# Patient Record
Sex: FEMALE | Race: BLACK OR AFRICAN AMERICAN | Employment: FULL TIME | URBAN - METROPOLITAN AREA
[De-identification: names, ages, dates, MRNs, and addresses within clinical notes are randomized per-mention and may not be internally consistent; named-entity substitution may affect disease eponyms.]

---

## 2018-05-14 ENCOUNTER — HOSPITAL ENCOUNTER (EMERGENCY)
Facility: HOSPITAL | Age: 63
Discharge: HOME | End: 2018-05-14
Attending: EMERGENCY MEDICINE
Payer: COMMERCIAL

## 2018-05-14 VITALS
TEMPERATURE: 98 F | RESPIRATION RATE: 21 BRPM | SYSTOLIC BLOOD PRESSURE: 152 MMHG | WEIGHT: 220 LBS | BODY MASS INDEX: 35.36 KG/M2 | DIASTOLIC BLOOD PRESSURE: 91 MMHG | HEIGHT: 66 IN | HEART RATE: 68 BPM | OXYGEN SATURATION: 100 %

## 2018-05-14 DIAGNOSIS — L23.4 ALLERGIC CONTACT DERMATITIS DUE TO DYES: Primary | ICD-10-CM

## 2018-05-14 PROCEDURE — 96374 THER/PROPH/DIAG INJ IV PUSH: CPT

## 2018-05-14 PROCEDURE — 99284 EMERGENCY DEPT VISIT MOD MDM: CPT

## 2018-05-14 RX ORDER — METHYLPREDNISOLONE 4 MG/1
TABLET ORAL
Qty: 1 PACKAGE | Refills: 0 | Status: SHIPPED | OUTPATIENT
Start: 2018-05-14 | End: 2018-05-19

## 2018-05-14 RX ORDER — NAPROXEN SODIUM 220 MG
TABLET ORAL DAILY
COMMUNITY

## 2018-05-14 RX ORDER — HYDROCHLOROTHIAZIDE 25 MG/1
25 TABLET ORAL DAILY
COMMUNITY

## 2018-05-14 RX ORDER — DEXAMETHASONE SODIUM PHOSPHATE 4 MG/ML
10 VIAL (ML) INJECTION ONCE
Status: COMPLETED | OUTPATIENT
Start: 2018-05-14 | End: 2018-05-14

## 2018-05-14 NOTE — ED PROVIDER NOTES
Patient Seen in: [facility] Emergency Department    History   Patient presents with:   Allergic Rxn Allergies (immune)    Stated Complaint: allergic reaction    HPI    58-year-old with a history of asthma and allergies presents for evaluation of a po Erythematous rash to the scalp in the distribution of the hair without warmth or purulence  ED Course   Labs Reviewed - No data to display    ED Course as of May 14 0618  ------------------------------------------------------------    Patient had an IV yovani

## 2018-05-14 NOTE — ED INITIAL ASSESSMENT (HPI)
Pt ambulatory to er cc rash from hair color done on Saturday morning. Denies genoveva. States increase in redness and now feels burning around her eyes. Denies visual changes/drainage. Last benadryl 25mg q six hours - last taken at 0430 today.

## 2019-09-16 ENCOUNTER — OFFICE VISIT (OUTPATIENT)
Dept: ELECTROPHYSIOLOGY | Facility: HOSPITAL | Age: 64
End: 2019-09-16
Attending: FAMILY MEDICINE
Payer: COMMERCIAL

## 2019-09-16 DIAGNOSIS — M54.16 LUMBAR RADICULOPATHY: ICD-10-CM

## 2019-09-16 PROCEDURE — 95886 MUSC TEST DONE W/N TEST COMP: CPT | Performed by: OTHER

## 2019-09-16 PROCEDURE — 95909 NRV CNDJ TST 5-6 STUDIES: CPT | Performed by: OTHER

## 2019-09-16 PROCEDURE — 95885 MUSC TST DONE W/NERV TST LIM: CPT | Performed by: OTHER

## 2019-09-16 NOTE — PROCEDURES
[address] [place] [address]  [place], Analisa [facility]  [phone]            Patient: Ankur Mckeon Sex: Female  Patient ID: [id number] YOB: 1955      Visit Date: 9/16/2019 07:55  Patient Age On Visit Date: Muscle Nerve Roots IA Fib PSW Fasc H.F. Amp Dur. PPP Pattern   R. Tibialis anterior Deep peroneal (Fibular) L4-L5 N None None None None N N N N   R. Gastrocnemius (Medial head) Tibial S1-S2 1+ None 1+ None None N N N Mildly Reduced   R.  Vastus medialis Fem

## 2025-02-13 ENCOUNTER — OFFICE VISIT (OUTPATIENT)
Facility: LOCATION | Age: 70
End: 2025-02-13
Payer: MEDICARE

## 2025-02-13 DIAGNOSIS — E04.2 MULTIPLE THYROID NODULES: Primary | ICD-10-CM

## 2025-02-13 PROCEDURE — 99203 OFFICE O/P NEW LOW 30 MIN: CPT | Performed by: OTOLARYNGOLOGY

## 2025-02-13 NOTE — PROGRESS NOTES
Gina Johansen is a 69 year old female.   Chief Complaint   Patient presents with    Thyroid Problem     HPI:   She underwent a CT scan of the neck which showed thyroid nodules.  She has no pain or dysphagia.  She is not on thyroid medication.  She has no family history of thyroid cancer.  She has no history radiation exposure.  Current Outpatient Medications   Medication Sig Dispense Refill    hydrochlorothiazide 25 MG Oral Tab Take 25 mg by mouth daily.      Naproxen Sodium (ALEVE) 220 MG Oral Tab Take by mouth daily.        Past Medical History:    Asthma (HCC)      Social History:  Social History     Socioeconomic History    Marital status:    Tobacco Use    Smoking status: Never    Smokeless tobacco: Never      History reviewed. No pertinent surgical history.      REVIEW OF SYSTEMS:   GENERAL HEALTH: feels well otherwise  GENERAL : denies fever, chills, sweats, weight loss, weight gain  SKIN: denies any unusual skin lesions or rashes  RESPIRATORY: denies shortness of breath with exertion  NEURO: denies headaches    EXAM:   There were no vitals taken for this visit.    System Findings Details   Constitutional  Overall appearance - Normal.   Psychiatric  Orientation - Oriented to time, place, person & situation. Appropriate mood and affect.   Head/Face  Facial features -- Normal. Skull - Normal.   Eyes  Pupils equal ,round ,react to light and accomidate   Ears, Nose, Throat, Neck  Oropharynx clear neck supple without masses   Neurological  Memory - Normal. Cranial nerves - Cranial nerves II through XII grossly intact.   Lymph Detail  Submental. Submandibular. Anterior cervical. Posterior cervical. Supraclavicular.       ASSESSMENT AND PLAN:   1. Multiple thyroid nodules  Ultrasound from [facility] was reviewed.  This shows multiple nodules.  There is a 1.7 cm TR 4 nodule.  She will undergo biopsy of this nodule and we will speak after the above.  - US FNA THYROID, GUIDE INCLD, FIRST LESION  (CPT=10005); Future      The patient indicates understanding of these issues and agrees to the plan.    No follow-ups on file.    Harshal Rosales MD  2/13/2025  11:08 AM

## 2025-02-27 ENCOUNTER — HOSPITAL ENCOUNTER (OUTPATIENT)
Dept: ULTRASOUND IMAGING | Facility: HOSPITAL | Age: 70
Discharge: HOME | End: 2025-02-27
Attending: OTOLARYNGOLOGY
Payer: MEDICARE

## 2025-02-27 DIAGNOSIS — E04.2 MULTIPLE THYROID NODULES: ICD-10-CM

## 2025-02-27 PROCEDURE — 10005 FNA BX W/US GDN 1ST LES: CPT | Performed by: OTOLARYNGOLOGY

## 2025-02-27 PROCEDURE — 88173 CYTOPATH EVAL FNA REPORT: CPT | Performed by: OTOLARYNGOLOGY

## 2025-04-01 ENCOUNTER — TELEPHONE (OUTPATIENT)
Facility: LOCATION | Age: 70
End: 2025-04-01

## 2025-04-01 DIAGNOSIS — E04.2 NONTOXIC MULTINODULAR GOITER: Primary | ICD-10-CM

## 2025-05-15 ENCOUNTER — APPOINTMENT (OUTPATIENT)
Dept: ADMINISTRATIVE | Facility: HOSPITAL | Age: 70
End: 2025-05-15
Payer: MEDICARE

## 2025-05-15 RX ORDER — DICLOFENAC SODIUM 75 MG/1
75 TABLET, DELAYED RELEASE ORAL 2 TIMES DAILY PRN
COMMUNITY
End: 2025-05-28

## 2025-05-22 NOTE — H&P
[facility]  History & Physical    Gina Johansen Patient Status:  Outpatient in a Bed    1955 MRN [id number]   Location [facility] SURGERY Attending Harshal Rosales MD   Hosp Day # 0 PCP True Gary MD     History of Present Illness:  Gina Johansen is a(n) 69 year old female.  Patient with history of left-sided thyroid nodule.  This nodule underwent genomic testing which showed a 50% chance of malignancy.    History:  Past Medical History[1]  Past Surgical History[2]  Family History[3]   reports that she has never smoked. She has never used smokeless tobacco. She reports that she does not currently use alcohol. She reports that she does not use drugs.    Allergies:  Allergies[4]    Home Medications:  Prescriptions Prior to Admission[5]    Physical Exam:   General: Alert, orientated x3.  Cooperative.  No apparent distress.  Vital Signs:  Ht 5' 7\" (1.702 m)   Wt 190 lb (86.2 kg)   BMI 29.76 kg/m²   HEENT enlarged thyroid gland  Neck: No tenderness to palpitation.  Full range of motion to flexion and extension, lateral rotation and lateral flexion of cervical spine.  No JVD. Supple.   Lungs: Clear to auscultation bilaterally.  Cardiac: Regular rate and rhythm. No murmur.  Abdomen:  Soft, non-distended, non-tender, with no rebound or guarding.  No peritoneal signs. No ascites.  Liver is within normal limits.  Spleen is not palpable.    Extremities:  No lower extremity edema noted.  Without clubbing or cyanosis.    Skin: Normal texture and turgor.  Lymphatic:  No palpable cervical lymphadenopathy.  Neurologic: Cranial nerves are grossly intact.  Motor strength and sensory examination is grossly normal.  No focal neurologic deficit.    Laboratory Data:      Impression and Plan:  Problem List[6]  Left thyroid nodule 1.7 cm.  Fine-needle aspiration biopsy consistent with atypia of undetermined significance.  Genomic testing suggest a 50% chance of malignancy.    Left thyroid lobectomy with  possible total thyroidectomy if cancer is found.        Harshal Rosales MD  5/22/2025  10:05 AM         [1]   Past Medical History:   Anxiety state    Asthma (HCC)   [2] History reviewed. No pertinent surgical history.  [3] History reviewed. No pertinent family history.  [4]   Allergies  Allergen Reactions    Amoxicillin OTHER (SEE COMMENTS)     abd pain   [5]   No medications prior to admission.   [6]   Patient Active Problem List  Diagnosis    Lumbar radiculopathy

## 2025-05-27 ENCOUNTER — HOSPITAL ENCOUNTER (OUTPATIENT)
Facility: HOSPITAL | Age: 70
Discharge: HOME | End: 2025-05-28
Attending: OTOLARYNGOLOGY | Admitting: OTOLARYNGOLOGY
Payer: MEDICARE

## 2025-05-27 ENCOUNTER — ANESTHESIA EVENT (OUTPATIENT)
Dept: SURGERY | Facility: HOSPITAL | Age: 70
End: 2025-05-27
Payer: MEDICARE

## 2025-05-27 ENCOUNTER — ANESTHESIA (OUTPATIENT)
Dept: SURGERY | Facility: HOSPITAL | Age: 70
End: 2025-05-27
Payer: MEDICARE

## 2025-05-27 DIAGNOSIS — E04.2 NONTOXIC MULTINODULAR GOITER: ICD-10-CM

## 2025-05-27 PROBLEM — E04.1 THYROID NODULE: Status: ACTIVE | Noted: 2025-05-27

## 2025-05-27 PROCEDURE — 60220 PARTIAL REMOVAL OF THYROID: CPT | Performed by: OTOLARYNGOLOGY

## 2025-05-27 RX ORDER — CALCIUM CARBONATE 500(1250)
TABLET ORAL
Status: COMPLETED
Start: 2025-05-27 | End: 2025-05-27

## 2025-05-27 RX ORDER — SODIUM CHLORIDE AND POTASSIUM CHLORIDE 150; 900 MG/100ML; MG/100ML
INJECTION, SOLUTION INTRAVENOUS CONTINUOUS
Status: DISCONTINUED | OUTPATIENT
Start: 2025-05-27 | End: 2025-05-28

## 2025-05-27 RX ORDER — METOCLOPRAMIDE HYDROCHLORIDE 5 MG/ML
10 INJECTION INTRAMUSCULAR; INTRAVENOUS EVERY 8 HOURS PRN
Status: DISCONTINUED | OUTPATIENT
Start: 2025-05-27 | End: 2025-05-28

## 2025-05-27 RX ORDER — MIDAZOLAM HYDROCHLORIDE 1 MG/ML
INJECTION INTRAMUSCULAR; INTRAVENOUS AS NEEDED
Status: DISCONTINUED | OUTPATIENT
Start: 2025-05-27 | End: 2025-05-27 | Stop reason: SURG

## 2025-05-27 RX ORDER — LIDOCAINE HYDROCHLORIDE AND EPINEPHRINE 10; 10 MG/ML; UG/ML
INJECTION, SOLUTION INFILTRATION; PERINEURAL AS NEEDED
Status: DISCONTINUED | OUTPATIENT
Start: 2025-05-27 | End: 2025-05-27 | Stop reason: HOSPADM

## 2025-05-27 RX ORDER — CALCITRIOL 0.25 UG/1
CAPSULE, LIQUID FILLED ORAL
Status: COMPLETED
Start: 2025-05-27 | End: 2025-05-27

## 2025-05-27 RX ORDER — HYDROMORPHONE HYDROCHLORIDE 1 MG/ML
0.6 INJECTION, SOLUTION INTRAMUSCULAR; INTRAVENOUS; SUBCUTANEOUS EVERY 5 MIN PRN
Status: DISCONTINUED | OUTPATIENT
Start: 2025-05-27 | End: 2025-05-27 | Stop reason: HOSPADM

## 2025-05-27 RX ORDER — CALCIUM CARBONATE 500(1250)
1000 TABLET ORAL
Status: DISCONTINUED | OUTPATIENT
Start: 2025-05-27 | End: 2025-05-28

## 2025-05-27 RX ORDER — SODIUM CHLORIDE, SODIUM LACTATE, POTASSIUM CHLORIDE, CALCIUM CHLORIDE 600; 310; 30; 20 MG/100ML; MG/100ML; MG/100ML; MG/100ML
INJECTION, SOLUTION INTRAVENOUS CONTINUOUS
Status: DISCONTINUED | OUTPATIENT
Start: 2025-05-27 | End: 2025-05-27

## 2025-05-27 RX ORDER — HYDROMORPHONE HYDROCHLORIDE 1 MG/ML
0.4 INJECTION, SOLUTION INTRAMUSCULAR; INTRAVENOUS; SUBCUTANEOUS EVERY 2 HOUR PRN
Status: DISCONTINUED | OUTPATIENT
Start: 2025-05-27 | End: 2025-05-28

## 2025-05-27 RX ORDER — PHENYLEPHRINE HCL 10 MG/ML
VIAL (ML) INJECTION AS NEEDED
Status: DISCONTINUED | OUTPATIENT
Start: 2025-05-27 | End: 2025-05-27 | Stop reason: SURG

## 2025-05-27 RX ORDER — HYDROMORPHONE HYDROCHLORIDE 1 MG/ML
INJECTION, SOLUTION INTRAMUSCULAR; INTRAVENOUS; SUBCUTANEOUS
Status: COMPLETED
Start: 2025-05-27 | End: 2025-05-27

## 2025-05-27 RX ORDER — LIDOCAINE HYDROCHLORIDE 10 MG/ML
INJECTION, SOLUTION EPIDURAL; INFILTRATION; INTRACAUDAL; PERINEURAL AS NEEDED
Status: DISCONTINUED | OUTPATIENT
Start: 2025-05-27 | End: 2025-05-27 | Stop reason: SURG

## 2025-05-27 RX ORDER — ONDANSETRON 2 MG/ML
INJECTION INTRAMUSCULAR; INTRAVENOUS AS NEEDED
Status: DISCONTINUED | OUTPATIENT
Start: 2025-05-27 | End: 2025-05-27 | Stop reason: SURG

## 2025-05-27 RX ORDER — CALCITRIOL 0.25 UG/1
0.25 CAPSULE, LIQUID FILLED ORAL DAILY
Status: DISCONTINUED | OUTPATIENT
Start: 2025-05-27 | End: 2025-05-28

## 2025-05-27 RX ORDER — NALOXONE HYDROCHLORIDE 0.4 MG/ML
0.08 INJECTION, SOLUTION INTRAMUSCULAR; INTRAVENOUS; SUBCUTANEOUS AS NEEDED
Status: DISCONTINUED | OUTPATIENT
Start: 2025-05-27 | End: 2025-05-27 | Stop reason: HOSPADM

## 2025-05-27 RX ORDER — LABETALOL HYDROCHLORIDE 5 MG/ML
5 INJECTION, SOLUTION INTRAVENOUS EVERY 5 MIN PRN
Status: DISCONTINUED | OUTPATIENT
Start: 2025-05-27 | End: 2025-05-27 | Stop reason: HOSPADM

## 2025-05-27 RX ORDER — ONDANSETRON 2 MG/ML
4 INJECTION INTRAMUSCULAR; INTRAVENOUS EVERY 6 HOURS PRN
Status: DISCONTINUED | OUTPATIENT
Start: 2025-05-27 | End: 2025-05-28

## 2025-05-27 RX ORDER — HYDROCODONE BITARTRATE AND ACETAMINOPHEN 5; 325 MG/1; MG/1
1 TABLET ORAL ONCE AS NEEDED
Status: DISCONTINUED | OUTPATIENT
Start: 2025-05-27 | End: 2025-05-27 | Stop reason: HOSPADM

## 2025-05-27 RX ORDER — DEXAMETHASONE SODIUM PHOSPHATE 4 MG/ML
VIAL (ML) INJECTION AS NEEDED
Status: DISCONTINUED | OUTPATIENT
Start: 2025-05-27 | End: 2025-05-27 | Stop reason: SURG

## 2025-05-27 RX ORDER — ACETAMINOPHEN 500 MG
1000 TABLET ORAL ONCE
Status: DISCONTINUED | OUTPATIENT
Start: 2025-05-27 | End: 2025-05-27 | Stop reason: HOSPADM

## 2025-05-27 RX ORDER — HYDROMORPHONE HYDROCHLORIDE 1 MG/ML
0.4 INJECTION, SOLUTION INTRAMUSCULAR; INTRAVENOUS; SUBCUTANEOUS EVERY 5 MIN PRN
Status: DISCONTINUED | OUTPATIENT
Start: 2025-05-27 | End: 2025-05-27 | Stop reason: HOSPADM

## 2025-05-27 RX ORDER — OXYCODONE HYDROCHLORIDE 5 MG/1
5 TABLET ORAL EVERY 4 HOURS PRN
Status: DISCONTINUED | OUTPATIENT
Start: 2025-05-27 | End: 2025-05-28

## 2025-05-27 RX ORDER — CALCITRIOL 0.25 UG/1
0.25 CAPSULE, LIQUID FILLED ORAL ONCE
Status: COMPLETED | OUTPATIENT
Start: 2025-05-27 | End: 2025-05-27

## 2025-05-27 RX ORDER — SODIUM CHLORIDE, SODIUM LACTATE, POTASSIUM CHLORIDE, CALCIUM CHLORIDE 600; 310; 30; 20 MG/100ML; MG/100ML; MG/100ML; MG/100ML
INJECTION, SOLUTION INTRAVENOUS CONTINUOUS
Status: DISCONTINUED | OUTPATIENT
Start: 2025-05-27 | End: 2025-05-27 | Stop reason: HOSPADM

## 2025-05-27 RX ORDER — CALCIUM CARBONATE 500(1250)
1000 TABLET ORAL ONCE
Status: COMPLETED | OUTPATIENT
Start: 2025-05-27 | End: 2025-05-27

## 2025-05-27 RX ORDER — ACETAMINOPHEN 500 MG
1000 TABLET ORAL ONCE AS NEEDED
Status: DISCONTINUED | OUTPATIENT
Start: 2025-05-27 | End: 2025-05-27 | Stop reason: HOSPADM

## 2025-05-27 RX ORDER — HYDROMORPHONE HYDROCHLORIDE 1 MG/ML
0.2 INJECTION, SOLUTION INTRAMUSCULAR; INTRAVENOUS; SUBCUTANEOUS EVERY 5 MIN PRN
Status: DISCONTINUED | OUTPATIENT
Start: 2025-05-27 | End: 2025-05-27 | Stop reason: HOSPADM

## 2025-05-27 RX ORDER — ACETAMINOPHEN 325 MG/1
650 TABLET ORAL
Status: DISCONTINUED | OUTPATIENT
Start: 2025-05-27 | End: 2025-05-28

## 2025-05-27 RX ORDER — HYDROCODONE BITARTRATE AND ACETAMINOPHEN 5; 325 MG/1; MG/1
2 TABLET ORAL ONCE AS NEEDED
Status: DISCONTINUED | OUTPATIENT
Start: 2025-05-27 | End: 2025-05-27 | Stop reason: HOSPADM

## 2025-05-27 RX ORDER — HYDROMORPHONE HYDROCHLORIDE 1 MG/ML
0.2 INJECTION, SOLUTION INTRAMUSCULAR; INTRAVENOUS; SUBCUTANEOUS EVERY 2 HOUR PRN
Status: DISCONTINUED | OUTPATIENT
Start: 2025-05-27 | End: 2025-05-28

## 2025-05-27 RX ADMIN — SODIUM CHLORIDE, SODIUM LACTATE, POTASSIUM CHLORIDE, CALCIUM CHLORIDE: 600; 310; 30; 20 INJECTION, SOLUTION INTRAVENOUS at 08:47:00

## 2025-05-27 RX ADMIN — PHENYLEPHRINE HCL 100 MCG: 10 MG/ML VIAL (ML) INJECTION at 09:59:00

## 2025-05-27 RX ADMIN — PHENYLEPHRINE HCL 100 MCG: 10 MG/ML VIAL (ML) INJECTION at 09:15:00

## 2025-05-27 RX ADMIN — MIDAZOLAM HYDROCHLORIDE 2 MG: 1 INJECTION INTRAMUSCULAR; INTRAVENOUS at 08:49:00

## 2025-05-27 RX ADMIN — LIDOCAINE HYDROCHLORIDE 50 MG: 10 INJECTION, SOLUTION EPIDURAL; INFILTRATION; INTRACAUDAL; PERINEURAL at 08:53:00

## 2025-05-27 RX ADMIN — SODIUM CHLORIDE, SODIUM LACTATE, POTASSIUM CHLORIDE, CALCIUM CHLORIDE: 600; 310; 30; 20 INJECTION, SOLUTION INTRAVENOUS at 10:19:00

## 2025-05-27 RX ADMIN — DEXAMETHASONE SODIUM PHOSPHATE 4 MG: 4 MG/ML VIAL (ML) INJECTION at 09:48:00

## 2025-05-27 RX ADMIN — ONDANSETRON 4 MG: 2 INJECTION INTRAMUSCULAR; INTRAVENOUS at 09:55:00

## 2025-05-27 RX ADMIN — PHENYLEPHRINE HCL 100 MCG: 10 MG/ML VIAL (ML) INJECTION at 09:45:00

## 2025-05-27 NOTE — ANESTHESIA POSTPROCEDURE EVALUATION
[facility]    Gina Johansen Patient Status:  Outpatient in a Bed   Age/Gender 69 year old female MRN [id number]   Location [facility] POST ANESTHESIA CARE UNIT Attending Harshal Rosales MD   Hosp Day # 0 PCP True Gary MD       Anesthesia Post-op Note    LEFT THYROID LOBECTOMY, WITH ISTHMUSECTOMY    Procedure Summary       Date: 05/27/25 Room / Location:  MAIN OR 02 / [facility] MAIN OR    Anesthesia Start: 0847 Anesthesia Stop: 1019    Procedure: LEFT THYROID LOBECTOMY, WITH ISTHMUSECTOMY (Left) Diagnosis:       Nontoxic multinodular goiter      (Nontoxic multinodular goiter [E04.2])    Surgeons: Harshal Rosales MD Anesthesiologist: Alexis Dias MD    Anesthesia Type: general ASA Status: 2            Anesthesia Type: general    Vitals Value Taken Time   /80 05/27/25 10:21   Temp 98.3 05/27/25 10:22   Pulse 83 05/27/25 10:21   Resp 18 05/27/25 10:21   SpO2 96 % 05/27/25 10:21   Vitals shown include unfiled device data.        Patient Location: PACU    Anesthesia Type: general    Airway Patency: patent    Postop Pain Control: adequate    Mental Status: preanesthetic baseline    Nausea/Vomiting: none    Cardiopulmonary/Hydration status: stable euvolemic    Complications: no apparent anesthesia related complications    Postop vital signs: stable    Dental Exam: Unchanged from Preop    Patient to be discharged from PACU when criteria met.

## 2025-05-27 NOTE — SPIRITUAL CARE NOTE
Spiritual Care Visit Note    Patient Name: Gina Johansen Date of Spiritual Care Visit: 25   : 1955 Primary Dx: <principal problem not specified>       Referred By:      Spiritual Care Taxonomy:    Intended Effects: Convey a calming presence    Methods: Offer support, Offer spiritual/Congregational support    Interventions: Active listening, Ask guided questions, Explain  role, [place], Share words of hope and inspiration    Visit Type/Summary:     - Spiritual Care:  visited with patient and her friend/family Cookie. The patient told  she is part of [place] [employer] that believes in God. She asked for prayer.  and patient briefly discussed appropriateness of prayer to honor her bandar. prayed with patient and for Cookie. Patient told  the prayer was perfect. Patient confirmed no further support is needed.  remains available as needed for follow up.    Spiritual Care support can be requested via an Epic consult. For urgent/immediate needs, please contact the On Call  at: Edward: ext [phone]    Min. Mikayla Obregon Mdiv.

## 2025-05-27 NOTE — OPERATIVE REPORT
[facility]  Op Note    Gina Johansen Location: OR   CSN [id number] MRN [id number]   Admission Date 5/27/2025 Operation Date 5/27/2025   Attending Physician Harshal Rosales MD Operating Physician Harshal Rosales MD     Pre-Operative Diagnosis: Nontoxic multinodular goiter [E04.2]    Post-Operative Diagnosis: Same as above    Procedure Performed: Procedure(s):  LEFT THYROID LOBECTOMY, WITH ISTHMUSECTOMY    Surgeon: Surgeon(s):  Harshal Rosales MD     Anesthesia: General        Summary of Case: After satisfactory general endotracheal anesthesia induction the patient was prepared for the procedure.  A shoulder roll was placed.  1% lidocaine with epinephrine was injected at the anterior neck.  There was then prepped and draped usual sterile fashion.  The recurrent laryngeal nerve was monitored throughout the case.    A #15 blade was used to make an incision at the anterior neck.  This is carried down through the platysma muscle.  Subplatysmal flaps were raised both superiorly and inferiorly.  Self-retaining retractors were placed.    Dissection was carried out midline between the strap musculature.  The strap muscles were carefully elevated off the left lobe of the thyroid gland.  The middle thyroid vein was clamped and tied off with silk ties.  Inferiorly there is numerous veins coming into the thyroid over the trachea which were clamped and tied off with silk ties.  The LigaSure was also used to seal vessels.  We turned our attention superiorly.  There is numerous vessels coming into the superior pole of the thyroid gland which were clamped and tied off with silk ties.  We turned our attention back laterally.  The recurrent laryngeal nerve was identified.  He was stimulated and kept intact throughout the case.    The parathyroids were carefully teased free of the thyroid capsule taking care to keep the blood supply intact.  The left lobe of the thyroid was then rolled from lateral to medial coming across  Reyes's ligament.  The thyroid was then divided at the right side of the isthmus utilizing the LigaSure.  The left lobe and isthmus were then sent to pathology for analysis.    The wound was irrigated out with saline.  The no further signs of bleeding.  The nerve was stimulated in the case and noted be intact.  The parathyroids were a good color.  Surgicel plywood was placed into the wound.  The strap muscles were closed with Vicryl suture.  The deep layers were closed with Vicryl suture.  The skin was closed with a subcuticular suture.  Steri-Strips and sterile dressing was applied.  Patient was awoken extubated shows recovery room in stable condition.    Harshal Rosales MD  5/27/2025  10:01 AM

## 2025-05-27 NOTE — ANESTHESIA PREPROCEDURE EVALUATION
PRE-OP EVALUATION    Patient Name: Gina Johansen    Admit Diagnosis: Nontoxic multinodular goiter [E04.2]    Pre-op Diagnosis: Nontoxic multinodular goiter [E04.2]    Total Thyroid Lobectomy; Unilateral , with or without Isthmusectomy; Left Side    Anesthesia Procedure: Total Thyroid Lobectomy; Unilateral , with or without Isthmusectomy; Left Side (Left)    Surgeons and Role:     * Harshal Rosales MD - Primary    Pre-op vitals reviewed.  Temp: 96.9 °F (36.1 °C)  Pulse: 78  Resp: 16  BP: 151/90  SpO2: 99 %  Body mass index is 30.54 kg/m².    Current medications reviewed.  Hospital Medications:  Current Medications[1]    Outpatient Medications:   Prescriptions Prior to Admission[2]    Allergies: Amoxicillin      Anesthesia Evaluation    Patient summary reviewed.    Anesthetic Complications           GI/Hepatic/Renal                                 Cardiovascular    Negative cardiovascular ROS.    Exercise tolerance: good     MET: >4                                           Endo/Other      (-) diabetes                            Pulmonary    Negative pulmonary ROS.  (-) asthma                     Neuro/Psych                                      Past Surgical History[3]  Social Hx on file[4]  History   Drug Use Unknown     Available pre-op labs reviewed.               Airway      Mallampati: II  Mouth opening: >3 FB  TM distance: > 6 cm  Neck ROM: full Cardiovascular      Rhythm: regular       Dental    Dentition appears grossly intact         Pulmonary    Pulmonary exam normal.                 Other findings              ASA: 2   Plan: general  NPO status verified and           Plan/risks discussed with: patient            R/B/A were explained including but not limited to risk of aspiration, perioperative cardiac events, lip gum or teeth injury, anaphylactic reaction, corneal abrasion, PONV, sore throat. All questions answered and the patient agreed to proceed.       Present on Admission:  **None**              [1]    [Transfer Hold] acetaminophen (Tylenol Extra Strength) tab 1,000 mg  1,000 mg Oral Once    lactated ringers infusion   Intravenous Continuous   [2]   Medications Prior to Admission   Medication Sig Dispense Refill Last Dose/Taking    diclofenac 75 MG Oral Tab EC Take 1 tablet (75 mg total) by mouth 2 (two) times daily as needed.   5/11/2025   [3] History reviewed. No pertinent surgical history.  [4]   Social History  Socioeconomic History    Marital status:    Tobacco Use    Smoking status: Never    Smokeless tobacco: Never   Vaping Use    Vaping status: Never Used   Substance and Sexual Activity    Alcohol use: Not Currently    Drug use: Never

## 2025-05-27 NOTE — PROGRESS NOTES
NURSING ADMISSION NOTE      Patient admitted via Cart  Oriented to room.  Safety precautions initiated.  Bed in low position.  Call light in reach.Received patient from PACU . Alert and orient x 4 , on room air , vitals stable , ant neck dressing dry and intact , denies any pain medicine . B/l scd's on . Plan of care discussed with patient . Skin assessment done with Stephanie , no skin breakdown noted . Will continue to monitor

## 2025-05-27 NOTE — ANESTHESIA PROCEDURE NOTES
Airway  Date/Time: 5/27/2025 8:55 AM  Reason: elective      General Information and Staff   Patient location during procedure: OR  Anesthesiologist: Alexis Dias MD  Performed: anesthesiologist   Performed by: Alexis Dias MD  Authorized by: Alexis Dias MD        Indications and Patient Condition  Indications for airway management: anesthesia  Sedation level: deep      Preoxygenated: yesPatient position: sniffing    Mask difficulty assessment: 1 - vent by mask    Final Airway Details    Final airway type: endotracheal airway    Successful airway: ETT (NIM)  Cuffed: yes   Successful intubation technique: direct laryngoscopy  Endotracheal tube insertion site: oral  Blade: Casandra  Blade size: #4  ETT size (mm): 7.0    Cormack-Lehane Classification: grade I - full view of glottis  Placement verified by: capnometry   Measured from: lips  ETT to lips (cm): 21  Number of attempts at approach: 1

## 2025-05-28 VITALS
BODY MASS INDEX: 30.61 KG/M2 | WEIGHT: 195 LBS | HEART RATE: 61 BPM | HEIGHT: 67 IN | RESPIRATION RATE: 16 BRPM | SYSTOLIC BLOOD PRESSURE: 131 MMHG | TEMPERATURE: 99 F | OXYGEN SATURATION: 98 % | DIASTOLIC BLOOD PRESSURE: 68 MMHG

## 2025-05-28 LAB — CALCIUM BLD-MCNC: 9.3 MG/DL (ref 8.7–10.6)

## 2025-05-28 NOTE — PROGRESS NOTES
[facility]  Progress Note    Gina Johansen Patient Status:  Outpatient in a Bed    1955 MRN [id number]   Location [facility] 3NW-A Attending Harshal Rosales MD   Hosp Day # 0 PCP True Gary MD     Subjective:  Minimal pain.   No numbness, tingling or cramping.    Objective/Physical Exam:  General: Alert, orientated x3.  Cooperative.  No apparent distress.  Vital Signs:  Blood pressure (!) 162/83, pulse 61, temperature 97.8 °F (36.6 °C), temperature source Oral, resp. rate 15, height 5' 7\" (1.702 m), weight 195 lb (88.5 kg), SpO2 100%.  HEENT: Good voice.  Neck: Supple.  Minimal swelling.  Dressing dry and intact.    Labs:  Calcium 9.3    Assessment/Plan:  Problem List[1]    Stable post thyroid lobectomy    Home on tylenol for pain. Calcium prn      Harshal Rosales MD  2025  7:50 AM         [1]   Patient Active Problem List  Diagnosis    Lumbar radiculopathy    Thyroid nodule

## 2025-05-28 NOTE — PROGRESS NOTES
NURSING DISCHARGE NOTE    Discharged Home via Ambulatory.  Accompanied by Family member  Belongings Taken by patient/family. DISCUSSED DISCHARGE INSTRUCTIONS WITH PATIENT AND FAMILY . ANSWERED ALL QUESTIONS . VERBALIZED UNDERSTANDING

## 2025-05-28 NOTE — PROGRESS NOTES
Patient is anox4, on room air, up independently after setup, receiving IVF, pain managed w/ Tylenol and ice, voiding well.     X1 incision noted to neck, dressed w/ gauze and tegaderm, CDI, no drainage noted at this time. Patient denies any numbness or tingling since surgery, denies any SOB or difficulty breathing.     Patient updated on plan of care.

## 2025-05-28 NOTE — DISCHARGE INSTRUCTIONS
[address] [facility] [place]  [address]  [phone]    [facility]  Kory Kim MD, FACS  MD Vj Eubanks MD    THYROIDECTOMY INSTRUCTIONS    What to Expect in the Postoperative Period  You will spend the night in the hospital.  During this time the wound is monitored, as are your calcium levels in the blood.   Mild to moderate postoperative pain is normal.  This is easily controlled with medication.   A surgical drain in the wound is sometimes necessary to prevent accumulation of fluid under the wound.  This is removed before you leave the hospital.  Sore throat and discomfort with swallowing are due to general anesthesia and the surgery itself, and are short term only.  Voice hoarseness may be temporary or permanent, depending on the nature of your thyroid problem.  Usually the voice sounds normal within 1-2 weeks of surgery, but if not, please notify your surgeon.  Decreased blood calcium levels may manifest as numbness and tingling around the mouth & fingers, spontaneous twitching in the face or mouth, and muscle spasm and cramping.  Please notify your surgeon or go to the Emergency Room if this condition occurs. Treatment typically involves oral calcium supplements.   Bleeding is rare after thyroid surgery.  If this occurs, extensive swelling of the neck can occur and may need to be drained. Notify your surgeon or go the Emergency Room immediately.     Activity and Restrictions  You will need to rest (off work or school) for 1 week after surgery.   No vigorous activity, heavy lifting (greater than 20 pounds), bending or straining for 2 weeks.  No driving for 1 week.     Medication  You will be given a prescription for pain medication (do not drive or drink alcohol while taking this medication).  You may also be given calcium supplements to prevent low calcium levels in the blood.    Follow up  You will be seen in the office 1 week after surgery (call  for an appointment if you do not already have one.    You may be instructed to follow-up with your primary care physician or an endocrinologist if long-term use of thyroid hormone replacement is necessary.

## 2025-05-29 ENCOUNTER — MED REC SCAN ONLY (OUTPATIENT)
Facility: LOCATION | Age: 70
End: 2025-05-29

## 2025-06-03 ENCOUNTER — OFFICE VISIT (OUTPATIENT)
Facility: LOCATION | Age: 70
End: 2025-06-03
Payer: MEDICARE

## 2025-06-03 DIAGNOSIS — E04.2 MULTIPLE THYROID NODULES: Primary | ICD-10-CM

## 2025-06-03 PROCEDURE — 99024 POSTOP FOLLOW-UP VISIT: CPT | Performed by: OTOLARYNGOLOGY

## 2025-06-03 NOTE — PROGRESS NOTES
Gina Johansen is a 69 year old female. No chief complaint on file.    HPI:   She is postop thyroidectomy doing well.  She is having no pain or dysphagia.  She is having no numbness tingling or hoarseness.    REVIEW OF SYSTEMS:   GENERAL HEALTH: feels well otherwise  GENERAL : denies fever, chills, sweats, weight loss, weight gain  SKIN: denies any unusual skin lesions or rashes  RESPIRATORY: denies shortness of breath with exertion  NEURO: denies headaches    EXAM:   There were no vitals taken for this visit.    System Findings Details   Constitutional  Overall appearance - Normal.   Psychiatric  Orientation - Oriented to time, place, person & situation. Appropriate mood and affect.   Head/Face  Facial features -- Normal. Skull - Normal.   Eyes  Pupils equal ,round ,react to light and accomidate   Ears, Nose, Throat, Neck  There is mild swelling at the neck.  No evidence of fluctuance or infection.  The incision is healing well.   Neurological  Memory - Normal. Cranial nerves - Cranial nerves II through XII grossly intact.   Lymph Detail  Submental. Submandibular. Anterior cervical. Posterior cervical. Supraclavicular.       ASSESSMENT AND PLAN:   1. Multiple thyroid nodules  Stable status post left thyroid lobectomy.  We are awaiting final pathology report.  She will undergo thyroid function tests in 3 weeks.  - TSH and Free T4 [E]; Future      The patient indicates understanding of these issues and agrees to the plan.    No follow-ups on file.    Harshal Rosales MD  6/3/2025  3:04 PM

## 2025-06-04 ENCOUNTER — TELEPHONE (OUTPATIENT)
Facility: LOCATION | Age: 70
End: 2025-06-04

## 2025-06-04 NOTE — TELEPHONE ENCOUNTER
Pathologist,  has called the office needing to speak to MD regarding Pt's pending pathology report. A good call back number is [phone]. Please advise.

## 2025-06-06 DIAGNOSIS — C73 PAPILLARY THYROID CARCINOMA (HCC): Primary | ICD-10-CM

## 2025-06-06 NOTE — PROGRESS NOTES
I spoke with patient.  Patient has papillary thyroid carcinoma which is confined to the thyroid.  In my opinion we can be conservative and watch the other side.  I have asked her to see endocrinology.  I have also ordered a follow-up ultrasound in 6 months for surveillance.

## 2025-06-24 PROBLEM — E89.0 POSTOPERATIVE HYPOTHYROIDISM: Status: ACTIVE | Noted: 2025-06-24

## 2025-06-24 PROBLEM — C73 THYROID CANCER (HCC): Status: ACTIVE | Noted: 2025-06-24

## 2025-06-24 NOTE — PROGRESS NOTES
REASON FOR CONSULTATION:    Chief Complaint   Patient presents with    New Patient     NP, pt here for thyroid lobectomy, no other concerns she is her to establish care and talk about her next step          PHYSICIAN REQUESTING CONSULTATION:    True Gary MD      HPI:    Gina Johansen is a 69 year old female with history of anxiety,PreDM papillary thyroid cancer s/p left amira thyroidectomy  in 5/2025,   presents for initial consultation regarding, PTC      Thyroid cancer:    Diagnosis: 3/2025. Patient did not feel thyroid nodules and they were not felt during physical, her dentist requested CT of neck for possible parathyroid abnormality, which incidentally showed thyroid nodules  2/2025 Patient completed CT neck which incidentally showed thyroid nodules.   2/13/25 thyroid US:     2/27/25 FNA:     Affirma returned suspicious       Patient is s/p left thyroidectomy and isthmusectomy completed on 5/27/25 by Dr Rosales   Pathology results:    Patient did not receive I-131  Current symptoms: None  Neck swelling/ nodules:  Surveillance:  Thyroglobulin and thyrogobulin antibodies:None  Post treatment thyroid US:Due 11/2025 , ordered by ENT  No results found.  Risk factors:  Family history of thyroid cancer: no, 3 sisters s/p hemothyroidectomy, no thyroid cancer  Exposure to radiation to neck and head or ionizing radiation:No        Post surgical hypothyroidism  Post op TSH 6/23/25 3.1 , FT5 0.83  Pre op TSH 5/12/25 1.48  Patient has not taken thyroid medications in the past   Takes supplements D, B12, calcium   Levothyroxine dose:None  Compliance:NA  Symptoms: None                  ROS: see HPI            Past Medical History:    Anxiety state    Asthma (HCC)         History reviewed. No pertinent surgical history.      Current Outpatient Medications   Medication Sig Dispense Refill    hydrochlorothiazide 10mg/ml Oral Suspension Take by mouth As Directed. (Patient not taking: Reported on 7/8/2025)            Allergies   Allergen Reactions    Amoxicillin OTHER (SEE COMMENTS)     abd pain         Social History     Socioeconomic History    Marital status:    Tobacco Use    Smoking status: Never    Smokeless tobacco: Never   Vaping Use    Vaping status: Never Used   Substance and Sexual Activity    Alcohol use: Not Currently    Drug use: Never     Social Drivers of Health     Food Insecurity: No Food Insecurity (5/27/2025)    NCSS - Food Insecurity     Worried About Running Out of Food in the Last Year: No     Ran Out of Food in the Last Year: No   Transportation Needs: No Transportation Needs (5/27/2025)    NCSS - Transportation     Lack of Transportation: No   Housing Stability: Not At Risk (5/27/2025)    NCSS - Housing/Utilities     Has Housing: Yes     Worried About Losing Housing: No     Unable to Get Utilities: No         History reviewed. No pertinent family history.        PHYSICAL EXAM:      /80   Pulse 78   Resp 18   Ht 5' 7\" (1.702 m)   Wt 194 lb 12.8 oz (88.4 kg)   SpO2 99%   BMI 30.51 kg/m²   Neck: Well healed scar following thyroidectomy    Wt Readings from Last 3 Encounters:   07/08/25 194 lb 12.8 oz (88.4 kg)   05/27/25 195 lb (88.5 kg)   05/14/18 220 lb (99.8 kg)         Physical Exam    LABS: See HPI                IMAGING REVIEWED:see HPI      No results found.              ASSESSMENT/PLAN:      Diagnoses and all orders for this visit:    Thyroid cancer (HCC)  Left amira thyroidectomy completed in 5/27/25, patient has not received I-131 rx  Pathology notable for classic unifocal intrathyroidal  PTC largest dimension 1.9 cm, no marginal, extrathyroidal or lymphovascular invasion. stage Pt1b, Nx, Mx low risk for recurrence   Per latest [place] guidelines, given low risk PTC, GUZMAN is not indicated  Last Thyroid US : due around 11/2025, to be followed by ENT  Last TG panel : discussed low utility in obtaining given presence of intact right low, but will obtain as baseline  Continue  close surveillance with thyroglobulin antibodies every  6 months and thyroid imaging every 12 months, sooner if clinically indicated  Goal is to keep TSH ~ 0.5-2  We received in detail risk factors, pathophysiology, treatment modalities, recurrence risk and close monitoring through blood tests thyroid cancer recurrence.   Postoperative hypothyroidism  6/2025 TSH 3.1, normal FT4  Per latest MILTON guidelines, goal TSH is 0.5-2 to reduce risk of recurrence  Discussed starting levothyroxine to achieve TSH goal, reviewed pros and cons, patient elected for Lt4  Start 25 mcg once daily levothyroxine, reviewed directions for taking it.  Patient prefers to wean off medication if possible few years post op           Return in about 2 months (around 9/8/2025).      Thank you for allowing me to participate in the care of your patient. Please call if you have any questions or concerns.    The above plan was discussed in detail with the patient who verbalized understanding and agreement.      A total of 62  minutes was spent today on obtaining history, reviewing pertinent labs, reviewing relevant pathophysiology with patient, reviewing outside records, evaluating patient, providing multiple treatment options, completing documentation and orders, and communicating with other providers as appropriate.     Machelle Colmenares MD  [facility]    In reviewing this note, please be advised that Dragon Voice Recognition software used to dictate the note may have made errors in recognizing some of the words or phrases.     Note to patient: The 21 Century Cures Act makes medical notes like these available to patients in the interest of transparency. However, be advised this is a medical document. It is intended as peer to peer communication. It is written in medical language and may contain abbreviations or verbiage that are unfamiliar. It may appear blunt or direct. Medical documents are intended to carry relevant  information, facts as evident, and the clinical opinion of the practitioner.

## 2025-06-24 NOTE — PATIENT INSTRUCTIONS
Return Visit   [x] Dr. Colmenares in first 2 weeks of             PLAN:  Please start levothyroxine 25 mcg once daily   Synthroid (levothyroxine)   It is best to take this medicine on an empty stomach.   The levothyroxine should be taken at least 30-60 minutes before eating food/breakfast, with a glass full of water.   This medication should be taken at least 4 hours before or 4 hours after  calcium, iron, magnesium, selenium, vitamins acid blockers ( Rantac/ Pepcid/Prilosec,Protonix, TUMS, prevacid) and soy products since it can affect how the Synthroid (levothyroxine) is being absorbed. In this case, the dose of levothyroxine will need to be adjusted.  Remember that high dose BIOTIN( For example; Hair, skin  and nail supplements) can interfere with thyroid test results. Therefore, BIOTIN should be stopped for at least 7 days prior to lab draw.   Any weight changes of 10 lb or more can change your levothyroxine dose.    It is recommended to use a weekly pill box exclusively for levothyroxine to ensure 100% compliance         Please complete thyroid blood work (does not need to be fasting):  [] Today/this week  [] 4 weeks  [] 6 weeks,   [x] 2 months 1 week prior to follow up   [] 3 months  [] 4 months  [] 6 months  If you're taking biotin, you should stop taking it 7 days prior to your thyroid blood draw. Biotin doesn't impact your thyroid function, but it can effect the way the lab tests your blood so you should avoid taking it 7 days before.          To schedule tests:   Call Central [phone] for FNA and uptake and scan scheduling  Thyroid ultrasounds can be scheduled on the [facility].   [facility] is another affordable option in the area to complete ultrasound- you can call them directly to schedule, they will have our order in the system already.  [phone]  Blood tests can be done at any [facility] lab location, or at [facility] if that is what you requested.  Most of the labs  are walk in meaning you don't need an appointment. You can schedule these on the [facility] a walk in lab to have an appointment ready.  Here are the lab locations: [email or web address]        General follow up information:  Please let us know if you require any refills at least 1 week prior to your medication running out. If you do run out of medication, please call our office ASAP to request refills (do not wait until your follow up).  Please call us if you experience any problems with insurance coverage of medication, lab work, or imaging.   Lab results and imaging will typically be reviewed at follow up appointments, or within 3-5 business days of ALL results being in if you do not have an appointment scheduled in the near future. Our office will contact you for any abnormal results requiring more urgent follow up or action.   The on-call pager is for urgent matters only. If you are a type 1 diabetic and run out of insulin after business hours 8AM-4PM, you may call the on-call pager for a refill to a 24 hour pharmacy. If you have adrenal insufficiency and run out of steroids, you may call the on-call pager for a refill to a 24 hour pharmacy. All other refill requests should be requested during business hours.  Office phone number: [phone]  Office hours: Monday-Friday, closed on holidays  Phones open 8:30am-4:30pm    Machelle Colmenares MD  [facility]   Office phone number: [phone]  Fax number: [phone]

## 2025-07-08 ENCOUNTER — OFFICE VISIT (OUTPATIENT)
Facility: CLINIC | Age: 70
End: 2025-07-08
Payer: MEDICARE

## 2025-07-08 VITALS
SYSTOLIC BLOOD PRESSURE: 120 MMHG | BODY MASS INDEX: 30.57 KG/M2 | HEART RATE: 78 BPM | RESPIRATION RATE: 18 BRPM | WEIGHT: 194.81 LBS | DIASTOLIC BLOOD PRESSURE: 80 MMHG | OXYGEN SATURATION: 99 % | HEIGHT: 67 IN

## 2025-07-08 DIAGNOSIS — E89.0 POSTOPERATIVE HYPOTHYROIDISM: ICD-10-CM

## 2025-07-08 DIAGNOSIS — C73 THYROID CANCER (HCC): Primary | ICD-10-CM

## 2025-07-08 PROCEDURE — 99205 OFFICE O/P NEW HI 60 MIN: CPT | Performed by: INTERNAL MEDICINE

## 2025-07-08 RX ORDER — LEVOTHYROXINE SODIUM 25 UG/1
25 TABLET ORAL
Qty: 30 TABLET | Refills: 2 | Status: SHIPPED | OUTPATIENT
Start: 2025-07-08 | End: 2025-10-06

## (undated) DEVICE — SLEEVE COMPR MD KNEE LEN SGL USE KENDALL SCD

## (undated) DEVICE — ANTIBACTERIAL UNDYED BRAIDED (POLYGLACTIN 910), SYNTHETIC ABSORBABLE SUTURE: Brand: COATED VICRYL

## (undated) DEVICE — POWDER HEMSTAT 3GM OXIDIZED REGENERATED CELOS

## (undated) DEVICE — GAUZE - RF AND X-RAY DETECTABLE USP TYPE VII, 16 PLY: Brand: SITUATE

## (undated) DEVICE — SUT PERMA- 3-0 18IN NABSRB BLK TIE SILK

## (undated) DEVICE — PACK DRAPE HEAD/NECK STER

## (undated) DEVICE — 3M™ TEGADERM™ TRANSPARENT FILM DRESSING FRAME STYLE, 1626W, 4 IN X 4-3/4 IN (10 CM X 12 CM), 50/CT 4CT/CASE: Brand: 3M™ TEGADERM™

## (undated) DEVICE — SPONGE: SPECIALTY PEANUT XR 100/CS: Brand: MEDICAL ACTION INDUSTRIES

## (undated) DEVICE — LIGASURE EXACT DISSECTOR: Brand: LIGASURE

## (undated) DEVICE — EMG TUBE 8229707 NIM TRIVANTAGE 7.0MM ID: Brand: NIM TRIVANTAGE™

## (undated) DEVICE — SUT PERMA- 2-0 30IN NABSRB BLK TIE SILK

## (undated) DEVICE — MONITORING NEUROPHYSIOLOGICAL

## (undated) DEVICE — PROBE 8225101 5PK STD PRASS FL TIP ROHS

## (undated) DEVICE — PAD SACRAL SPAN AID

## (undated) DEVICE — INSULATED NEEDLE ELECTRODE: Brand: EDGE

## (undated) DEVICE — GAUZE,SPONGE,4"X4",12PLY,STERILE,LF,2'S: Brand: MEDLINE

## (undated) DEVICE — GLOVE SUR 7.5 SENSICARE PI PIP CRM PWD F

## (undated) DEVICE — 3M™ STERI-STRIP™ REINFORCED ADHESIVE SKIN CLOSURES, R1547, 1/2 IN X 4 IN (12 MM X 100 MM), 6 STRIPS/ENVELOPE: Brand: 3M™ STERI-STRIP™

## (undated) DEVICE — SUT MCRYL 4-0 27IN ABSRB UD 19MM PS-2 3/8

## (undated) DEVICE — SOLUTION IRRIG 1000ML 0.9% NACL USP BTL

## (undated) DEVICE — SKN PREP SPNG STKS PVP PNT STR: Brand: MEDLINE INDUSTRIES, INC.

## (undated) NOTE — ED AVS SNAPSHOT
Kenneth Lieberman   MRN: [id number]    Department:  [facility] Emergency Department   Date of Visit:  5/14/2018           Disclosure     Insurance plans vary and the physician(s) referred by the ER may not be covered by your plan.  Please contact your i tell this physician (or your personal doctor if your instructions are to return to your personal doctor) about any new or lasting problems. The primary care or specialist physician will see patients referred from the [facility] Emergency Department.  Nina Estrada

## (undated) NOTE — LETTER
[address]  [address]  Authorization for Surgical Operation and Procedure     Date:___________                                                                                                         Time:__________  I hereby authorize Surgeon(s):  Harshal Rosales MD, my physician and his/her assistants (if applicable), which may include medical students, residents, and/or fellows, to perform the following surgical operation/ procedure and administer such anesthesia as may be determined necessary by my physician:  Operation/Procedure name (s) Procedure(s):  LEFT THYROID LOBECTOMY, WITH OR WITHOUT ISTHMUSECTOMY, POSSIBLE TOTAL THYROIDECTOMY on Gina Johansen   2.   I recognize that during the surgical operation/procedure, unforeseen conditions may necessitate additional or different procedures than those listed above.  I, therefore, further authorize and request that the above-named surgeon, assistants, or designees perform such procedures as are, in their judgment, necessary and desirable.    3.   My surgeon/physician has discussed prior to my surgery the potential benefits, risks and side effects of this procedure; the likelihood of achieving goals; and potential problems that might occur during recuperation.  They also discussed reasonable alternatives to the procedure, including risks, benefits, and side effects related to the alternatives and risks related to not receiving this procedure.  I have had all my questions answered and I acknowledge that no guarantee has been made as to the result that may be obtained.    4.   Should the need arise during my operation/procedure, which includes change of level of care prior to discharge, I also consent to the administration of blood and/or blood products.  Further, I understand that despite careful testing and screening of blood or blood products by collecting agencies, I may still be subject to ill effects as a result of  receiving a blood transfusion and/or blood products.  The following are some, but not all, of the potential risks that can occur: fever and allergic reactions, hemolytic reactions, transmission of diseases such as Hepatitis, AIDS and Cytomegalovirus (CMV) and fluid overload.  In the event that I wish to have an autologous transfusion of my own blood, or a directed donor transfusion, I will discuss this with my physician.  Check only if Refusing Blood or Blood Products  I understand refusal of blood or blood products as deemed necessary by my physician may have serious consequences to my condition to include possible death. I hereby assume responsibility for my refusal and release the hospital, its personnel, and my physicians from any responsibility for the consequences of my refusal.          o  Refuse      5.   I authorize the use of any specimen, organs, tissues, body parts or foreign objects that may be removed from my body during the operation/procedure for diagnosis, research or teaching purposes and their subsequent disposal by hospital authorities.  I also authorize the release of specimen test results and/or written reports to my treating physician on the hospital medical staff or other referring or consulting physicians involved in my care, at the discretion of the Pathologist or my treating physician.    6.   I consent to the photographing or videotaping of the operations or procedures to be performed, including appropriate portions of my body for medical, scientific, or educational purposes, provided my identity is not revealed by the pictures or by descriptive texts accompanying them.  If the procedure has been photographed/videotaped, the surgeon will obtain the original picture, image, videotape or CD.  The hospital will not be responsible for storage, release or maintenance of the picture, image, tape or CD.    7.   I consent to the presence of a  or observers in the operating room  as deemed necessary by my physician or their designees.    8.   I recognize that in the event my procedure results in extended X-Ray/fluoroscopy time, I may develop a skin reaction.    9. If I have a Do Not Attempt Resuscitation (DNAR) order in place, that status will be suspended while in the operating room, procedural suite, and during the recovery period unless otherwise explicitly stated by me (or a person authorized to consent on my behalf). The surgeon or my attending physician will determine when the applicable recovery period ends for purposes of reinstating the DNAR order.  10. Patients having a sterilization procedure: I understand that if the procedure is successful the results will be permanent and it will therefore be impossible for me to inseminate, conceive, or bear children.  I also understand that the procedure is intended to result in sterility, although the result has not been guaranteed.   11. I acknowledge that my physician has explained sedation/analgesia administration to me including the risk and benefits I consent to the administration of sedation/analgesia as may be necessary or desirable in the judgment of my physician.    I CERTIFY THAT I HAVE READ AND FULLY UNDERSTAND THE ABOVE CONSENT TO OPERATION and/or OTHER PROCEDURE.    _________________________________________  __________________________________  Signature of Patient     Signature of Responsible Person         ___________________________________         Printed Name of Responsible Person           _________________________________                 Relationship to Patient  _________________________________________  ______________________________  Signature of Witness          Date  Time      Patient Name: Gina SMITH Eleno     : 1955                 Printed: May 27, 2025     Medical Record #: [id number]                     Page 1 of [address] [place] [address]   [id number]    Consent for Anesthesia    IGina agree to be cared for by an anesthesiologist, who is specially trained to monitor me and give me medicine to put me to sleep or keep me comfortable during my procedure    I understand that my anesthesiologist is not an employee or agent of [facility] or [employer]. He or she works for [email or web address].    As the patient asking for anesthesia services, I agree to:  Allow the anesthesiologist (anesthesia doctor) to give me medicine and do additional procedures as necessary. Some examples are: Starting or using an “IV” to give me medicine, fluids or blood during my procedure, and having a breathing tube placed to help me breathe when I’m asleep (intubation). In the event that my heart stops working properly, I understand that my anesthesiologist will make every effort to sustain my life, unless otherwise directed by [facility] Do Not Resuscitate documents.  Tell my anesthesia doctor before my procedure:  If I am pregnant.  The last time that I ate or drank.  All of the medicines I take (including prescriptions, herbal supplements, and pills I can buy without a prescription (including street drugs/illegal medications). Failure to inform my anesthesiologist about these medicines may increase my risk of anesthetic complications.  If I am allergic to anything or have had a reaction to anesthesia before.  I understand how the anesthesia medicine will help me (benefits).  I understand that with any type of anesthesia medicine there are risks:  The most common risks are: nausea, vomiting, sore throat, muscle soreness, damage to my eyes, mouth, or teeth (from breathing tube placement).  Rare risks include: remembering what happened during my procedure, allergic reactions to medications, injury to my airway, heart, lungs, vision, nerves, or muscles and in extremely rare instances death.  My doctor has explained to me other choices available  to me for my care (alternatives).  Pregnant Patients (“epidural”):  I understand that the risks of having an epidural (medicine given into my back to help control pain during labor), include itching, low blood pressure, difficulty urinating, headache or slowing of the baby’s heart. Very rare risks include infection, bleeding, seizure, irregular heart rhythms and nerve injury.  Regional Anesthesia (“spinal”, “epidural”, & “nerve blocks”):  I understand that rare but potential complications include headache, bleeding, infection, seizure, irregular heart rhythms, and nerve injury.    I can change my mind about having anesthesia services at any time before I get the medicine.    _____________________________________________________________________________  Patient (or Representative) Signature/Relationship to Patient  Date   Time    _____________________________________________________________________________   Name (if used)    Language/Organization   Time    _____________________________________________________________________________  Anesthesiologist Signature     Date   Time  I have discussed the procedure and information above with the patient (or patient’s representative) and answered their questions. The patient or their representative has agreed to have anesthesia services.    _____________________________________________________________________________  Witness        Date   Time  I have verified that the signature is that of the patient or patient’s representative, and that it was signed before the procedure  Patient Name: Gina Johansen     : 1955                 Printed: May 27, 2025     Medical Record #: [id number]                     Page 2 of 2

## (undated) NOTE — MR AVS SNAPSHOT
After Visit Summary   9/16/2019    Juan Ramon Leahy    MRN: [id number]           Visit Information     Date & Time  9/16/2019  8:00 AM Provider  Madalyn Dalton, [address] Department [facility] Neurodiagnostics Dept.  Phone  [phone]      Allergies Don’t forget strength training with weights and resistance Set goals and track your progress   You don’t need to join a gym. Home exercises work great.  Put more priority on exercise in your life           DM now offers Video Visits through [address] for a Conditions needing urgent attention, but are not life-threatening. Average cost  $120*       EMERGENCY ROOM         Life-threatening emergencies needing immediate intervention   at a hospital emergency room.       Average cost  $2,300*   *Cost junior